# Patient Record
Sex: FEMALE | URBAN - METROPOLITAN AREA
[De-identification: names, ages, dates, MRNs, and addresses within clinical notes are randomized per-mention and may not be internally consistent; named-entity substitution may affect disease eponyms.]

---

## 2017-05-27 ENCOUNTER — OUTPATIENT (OUTPATIENT)
Dept: OUTPATIENT SERVICES | Facility: HOSPITAL | Age: 42
LOS: 1 days | Discharge: HOME | End: 2017-05-27

## 2017-06-28 DIAGNOSIS — Z00.00 ENCOUNTER FOR GENERAL ADULT MEDICAL EXAMINATION WITHOUT ABNORMAL FINDINGS: ICD-10-CM

## 2017-06-28 DIAGNOSIS — R00.2 PALPITATIONS: ICD-10-CM

## 2017-06-28 DIAGNOSIS — R06.02 SHORTNESS OF BREATH: ICD-10-CM

## 2017-06-28 DIAGNOSIS — F41.8 OTHER SPECIFIED ANXIETY DISORDERS: ICD-10-CM

## 2017-06-28 DIAGNOSIS — E66.9 OBESITY, UNSPECIFIED: ICD-10-CM

## 2017-06-28 DIAGNOSIS — R07.89 OTHER CHEST PAIN: ICD-10-CM

## 2017-08-28 ENCOUNTER — APPOINTMENT (OUTPATIENT)
Dept: OBGYN | Facility: CLINIC | Age: 42
End: 2017-08-28

## 2018-11-28 ENCOUNTER — OUTPATIENT (OUTPATIENT)
Dept: OUTPATIENT SERVICES | Facility: HOSPITAL | Age: 43
LOS: 1 days | Discharge: HOME | End: 2018-11-28

## 2018-11-28 DIAGNOSIS — E66.9 OBESITY, UNSPECIFIED: ICD-10-CM

## 2018-11-28 DIAGNOSIS — R73.03 PREDIABETES: ICD-10-CM

## 2018-11-28 DIAGNOSIS — Z00.00 ENCOUNTER FOR GENERAL ADULT MEDICAL EXAMINATION WITHOUT ABNORMAL FINDINGS: ICD-10-CM

## 2018-11-28 DIAGNOSIS — E78.5 HYPERLIPIDEMIA, UNSPECIFIED: ICD-10-CM

## 2018-12-24 ENCOUNTER — APPOINTMENT (OUTPATIENT)
Dept: OBGYN | Facility: CLINIC | Age: 43
End: 2018-12-24

## 2019-01-04 ENCOUNTER — APPOINTMENT (OUTPATIENT)
Dept: OBGYN | Facility: CLINIC | Age: 44
End: 2019-01-04

## 2019-01-04 ENCOUNTER — OUTPATIENT (OUTPATIENT)
Dept: OUTPATIENT SERVICES | Facility: HOSPITAL | Age: 44
LOS: 1 days | Discharge: HOME | End: 2019-01-04

## 2019-01-04 VITALS
BODY MASS INDEX: 31.89 KG/M2 | DIASTOLIC BLOOD PRESSURE: 80 MMHG | HEIGHT: 63 IN | WEIGHT: 180 LBS | SYSTOLIC BLOOD PRESSURE: 120 MMHG

## 2019-01-04 DIAGNOSIS — Z01.419 ENCOUNTER FOR GYNECOLOGICAL EXAMINATION (GENERAL) (ROUTINE) W/OUT ABNORMAL FINDINGS: ICD-10-CM

## 2019-01-04 NOTE — PROCEDURE
[Cervical Pap Smear] : cervical Pap smear [Liquid Base] : liquid base [Tolerated Well] : the patient tolerated the procedure well [No Complications] : there were no complications

## 2019-01-08 DIAGNOSIS — Z00.00 ENCOUNTER FOR GENERAL ADULT MEDICAL EXAMINATION WITHOUT ABNORMAL FINDINGS: ICD-10-CM

## 2019-01-15 NOTE — COUNSELING
[Breast Self Exam] : breast self exam [Nutrition] : nutrition [Exercise] : exercise [Vitamins/Supplements] : vitamins/supplements [FreeTextEntry2] : h

## 2019-01-15 NOTE — HISTORY OF PRESENT ILLNESS
[___ Year(s) Ago] : [unfilled] year(s) ago [Good] : being in good health [Hot Flashes] : hot flashes [Night Sweats] : night sweats [4/10] : described as 4/10 in severity [Fatigue] : fatigue [Cool Environment] : relieved by cool environment [Exercise] : relieved by exercise [Relaxation] : relieved by relaxation [Approximately ___ (Month)] : the LMP was approximately [unfilled] month(s) ago [Sexually Active] : is sexually active [Monogamous] : is monogamous [Male ___] : [unfilled] male [NA] : N/A [No Risk Factors, Testing Not Required] : no risk factors, testing is not required. [de-identified] : menopausal [Vaginal Itching] : no vaginal itching [Dyspareunia] : no dyspareunia

## 2019-01-15 NOTE — END OF VISIT
[FreeTextEntry3] : I personally discussed this patient with WERNER Armando. I agree with the assessment and plan as written.

## 2019-01-15 NOTE — PHYSICAL EXAM
[Awake] : awake [Alert] : alert [Soft] : soft [Oriented x3] : oriented to person, place, and time [Normal] : uterus [No Bleeding] : there was no active vaginal bleeding [Pap Obtained] : a Pap smear was performed [Retroversion] : retroverted [Uterine Adnexae] : were not tender and not enlarged [Acute Distress] : no acute distress [Mass] : no breast mass [Nipple Discharge] : no nipple discharge [Axillary LAD] : no axillary lymphadenopathy [Tender] : non tender [FreeTextEntry6] : no palpable masses [FreeTextEntry7] : no palpable masses [FreeTextEntry9] : deferred

## 2019-01-16 DIAGNOSIS — Z01.419 ENCOUNTER FOR GYNECOLOGICAL EXAMINATION (GENERAL) (ROUTINE) WITHOUT ABNORMAL FINDINGS: ICD-10-CM

## 2019-03-22 LAB — HPV HIGH+LOW RISK DNA PNL CVX: NOT DETECTED

## 2019-04-01 ENCOUNTER — OUTPATIENT (OUTPATIENT)
Dept: OUTPATIENT SERVICES | Facility: HOSPITAL | Age: 44
LOS: 1 days | Discharge: HOME | End: 2019-04-01

## 2019-04-01 DIAGNOSIS — E66.9 OBESITY, UNSPECIFIED: ICD-10-CM

## 2019-04-01 DIAGNOSIS — E78.5 HYPERLIPIDEMIA, UNSPECIFIED: ICD-10-CM

## 2019-04-01 DIAGNOSIS — R73.03 PREDIABETES: ICD-10-CM

## 2022-01-19 ENCOUNTER — EMERGENCY (EMERGENCY)
Facility: HOSPITAL | Age: 47
LOS: 0 days | Discharge: HOME | End: 2022-01-19
Attending: STUDENT IN AN ORGANIZED HEALTH CARE EDUCATION/TRAINING PROGRAM | Admitting: STUDENT IN AN ORGANIZED HEALTH CARE EDUCATION/TRAINING PROGRAM
Payer: COMMERCIAL

## 2022-01-19 VITALS
HEART RATE: 81 BPM | DIASTOLIC BLOOD PRESSURE: 69 MMHG | OXYGEN SATURATION: 100 % | RESPIRATION RATE: 61 BRPM | TEMPERATURE: 99 F | SYSTOLIC BLOOD PRESSURE: 127 MMHG
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
## 2022-01-19 DIAGNOSIS — M25.572 PAIN IN LEFT ANKLE AND JOINTS OF LEFT FOOT: ICD-10-CM
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
## 2022-01-19 DIAGNOSIS — M25.472 EFFUSION, LEFT ANKLE: ICD-10-CM

## 2022-01-19 PROCEDURE — 29515 APPLICATION SHORT LEG SPLINT: CPT

## 2022-01-19 PROCEDURE — 73610 X-RAY EXAM OF ANKLE: CPT | Mod: 26,LT

## 2022-01-19 PROCEDURE — 73590 X-RAY EXAM OF LOWER LEG: CPT | Mod: 26,LT

## 2022-01-19 PROCEDURE — 99283 EMERGENCY DEPT VISIT LOW MDM: CPT | Mod: 25

## 2022-01-19 RX ORDER — OXYCODONE AND ACETAMINOPHEN 5; 325 MG/1; MG/1
1 TABLET ORAL ONCE
Refills: 0 | Status: DISCONTINUED | OUTPATIENT
Start: 2022-01-19 | End: 2022-01-19

## 2022-01-19 RX ADMIN — OXYCODONE AND ACETAMINOPHEN 1 TABLET(S): 5; 325 TABLET ORAL at 08:50

## 2022-01-19 NOTE — ED PROVIDER NOTE - CARE PROVIDER_API CALL
Jose Eduardo Perry)  Orthopaedic Surgery  [address] [place] [address]  Phone: [phone]  Fax: [phone]  Follow Up Time:

## 2022-01-19 NOTE — ED PROVIDER NOTE - ATTENDING CONTRIBUTION TO CARE
46 yr old f w/ no pmh who presents with L ankle pain. pt states that she was walking to work, when she slipped on black ice. Pt had a mecanical fall. Pt landed on his L ankle, however, denies hitting any other part of her body. Pt denies any other medical complaints.     VITAL SIGNS: I have reviewed nursing notes and confirm.  CONSTITUTIONAL: non-toxic, well appearing  SKIN: no rash, no petechiae.  EYES: PERRL, EOMI, pink conjunctiva, anicteric  ENT: tongue midline, no exudates, MMM  NECK: Supple; no meningismus, no JVD  EXT: Normal ROM x4. No edema.  LLE: there is swelling to the L ankle, pain on palpation of the lateral mallelous, sensation intact dp+2 bilaterally, pt has limited ROM at the ankle due to pain   NEURO: Alert, oriented x3. CN2-12 intact, equal strength bilaterally, nl gait.    a/p  46 yr old f that presents with L ankle pain. will splint, xray, pain management. will dc pt with orthopedics follow up and strict return precautions.

## 2022-01-19 NOTE — ED PROVIDER NOTE - CLINICAL SUMMARY MEDICAL DECISION MAKING FREE TEXT BOX
46 yr old f that presents with L ankle pain. will splint, xray, pain management. will dc pt with orthopedics follow up and strict return precautions.

## 2022-01-19 NOTE — ED ADULT NURSE NOTE - PAIN: BODY LOCATION
10/29/2021         RE: Georgie Scott  [address]  [place] [address]        Dear Colleague,    Thank you for referring your patient, Georgie Scott, to the [facility]. Please see a copy of my visit note below.    No notes on file    Again, thank you for allowing me to participate in the care of your patient.        Sincerely,        Melani Mcdaniels, DO  
ankle/Left:

## 2022-01-19 NOTE — ED PROVIDER NOTE - PHYSICAL EXAMINATION
CONST: Well appearing in NAD  MS: Normal ROM in all extremities. No midline spinal tenderness. Left ankle with swelling and tenderness laterally with dec ROM due to pain. NV intact distally  SKIN: Warm, dry, no acute rashes. Good turgor  NEURO: A&Ox3, No focal deficits. Strength 5/5 with no sensory deficits.

## 2022-01-19 NOTE — ED ADULT TRIAGE NOTE - CHIEF COMPLAINT QUOTE
"I was on my way to work and slipped on a patch of ice and heard my left ankle snap". c/o left ankle pain

## 2022-01-19 NOTE — ED PROVIDER NOTE - PATIENT PORTAL LINK FT
You can access the FollowMyHealth Patient Portal offered by [facility] by registering at the following website: [email or web address]. By joining [employer]’s FollowMyHealth portal, you will also be able to view your health information using other applications (apps) compatible with our system.

## 2023-01-18 ENCOUNTER — TRANSCRIPTION ENCOUNTER (OUTPATIENT)
Age: 48
End: 2023-01-18

## 2024-05-04 ENCOUNTER — EMERGENCY (EMERGENCY)
Facility: HOSPITAL | Age: 49
LOS: 0 days | Discharge: HOME | End: 2024-05-04
Attending: EMERGENCY MEDICINE
Payer: COMMERCIAL

## 2024-05-04 VITALS
HEART RATE: 66 BPM | TEMPERATURE: 98 F | WEIGHT: 160.06 LBS | RESPIRATION RATE: 16 BRPM | DIASTOLIC BLOOD PRESSURE: 64 MMHG | SYSTOLIC BLOOD PRESSURE: 119 MMHG | OXYGEN SATURATION: 100 %
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
## 2024-05-04 DIAGNOSIS — S82.61XA DISPLACED FRACTURE OF LATERAL MALLEOLUS OF RIGHT FIBULA, INITIAL ENCOUNTER FOR CLOSED FRACTURE: ICD-10-CM
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
## 2024-05-04 DIAGNOSIS — M25.571 PAIN IN RIGHT ANKLE AND JOINTS OF RIGHT FOOT: ICD-10-CM

## 2024-05-04 PROCEDURE — 73590 X-RAY EXAM OF LOWER LEG: CPT | Mod: 26,RT

## 2024-05-04 PROCEDURE — 73610 X-RAY EXAM OF ANKLE: CPT | Mod: RT

## 2024-05-04 PROCEDURE — 99284 EMERGENCY DEPT VISIT MOD MDM: CPT | Mod: 57

## 2024-05-04 PROCEDURE — 29515 APPLICATION SHORT LEG SPLINT: CPT | Mod: RT

## 2024-05-04 PROCEDURE — 27786 TREATMENT OF ANKLE FRACTURE: CPT | Mod: 54,RT

## 2024-05-04 PROCEDURE — 99284 EMERGENCY DEPT VISIT MOD MDM: CPT | Mod: 25

## 2024-05-04 PROCEDURE — 73590 X-RAY EXAM OF LOWER LEG: CPT | Mod: RT

## 2024-05-04 PROCEDURE — 73610 X-RAY EXAM OF ANKLE: CPT | Mod: 26,RT

## 2024-05-04 RX ORDER — IBUPROFEN 200 MG
600 TABLET ORAL ONCE
Refills: 0 | Status: COMPLETED | OUTPATIENT
Start: 2024-05-04 | End: 2024-05-04

## 2024-05-04 RX ADMIN — Medication 600 MILLIGRAM(S): at 18:41

## 2024-05-04 NOTE — ED PROVIDER NOTE - NSFOLLOWUPINSTRUCTIONS_ED_ALL_ED_FT
Our Emergency Department Referral Coordinators will be reaching out to you in the next 24-48 hours from 9:00am to 5:00pm to schedule a follow up appointment. Please expect a phone call from the hospital in that time frame. If you do not receive a call or if you have any questions or concerns, you can reach them at   [phone].    Fracture    A fracture is a break in one of your bones. This can occur from a variety of injuries, especially traumatic ones. Symptoms include pain, bruising, or swelling. Do not use the injured limb. If a fracture is in one of the bones below your waist, do not put weight on that limb unless instructed to do so by your healthcare provider. Crutches or a cane may have been provided. A splint or cast may have been applied by your health care provider. Make sure to keep it dry and follow up with an orthopedist as instructed.    SEEK IMMEDIATE MEDICAL CARE IF YOU HAVE ANY OF THE FOLLOWING SYMPTOMS: numbness, tingling, increasing pain, or weakness in any part of the injured limb.    SPLINT CARE - AfterCare(R) Instructions(ER/ED)     Splint Care    WHAT YOU NEED TO KNOW:    Splint care is important to help protect your splint until it comes off. Some splints are made of fiberglass or plaster that will need to dry and harden. Splint care will help the splint dry and harden correctly. Even after your splint hardens, it can be damaged.    DISCHARGE INSTRUCTIONS:    Return to the emergency department if:     You have increased pain.      Your fingers or toes are numb or tingling.      You feel burning or stinging around your injury.      Your nails, fingers, or toes turn pale, blue, or gray, and feel cold.      You have new or increased trouble moving your fingers or toes.      Your swelling gets worse.      The skin under your splint is bleeding or leaking pus.     Contact your healthcare provider if:     Your hard splint gets wet or is damaged.      You have a fever.      Your splint feels tighter.      You have itchy, dry skin under your splint that is getting worse.      The skin under your splint is red, or you have a new sore.      You notice a bad smell coming from your splint.       You have questions or concerns about your condition or care.    How to care for your splint:     Wait for your hard splint to harden completely. You may have to wait up to 3 days before you can walk on a plaster splint.      Check your splint and the skin around it each day. Check your splint for damage, such as cracks and breaks. Check your skin for redness, increased swelling, and sores. Loosen the elastic bandage around your splint if it feels too tight.      Keep your splint clean and dry. Keep dirt out of your splint. Before you bathe, wrap your hard splint with 2 layers of plastic. Then put a plastic bag over it. Keep the plastic bag tightly sealed. You can also ask your healthcare provider about waterproof shields. Do not put your hard splint in the water, even with a plastic bag over it. A wet splint can make your skin itchy, and may lead to infection.      Do not put powders or deodorants inside your splint. These can dry your skin and increase itching.       Do not try to scratch the skin inside your hard splint with sharp objects. Sharp objects can break off inside your splint or hurt your skin.       Do not pull the padding out of your splint. The padding inside your splint protects your skin. You may develop a sore on your skin if you take out the padding.    Follow up with your healthcare provider as directed within 1 to 2 weeks: Write down your questions so you remember to ask them during your visits.

## 2024-05-04 NOTE — ED PROVIDER NOTE - ATTENDING APP SHARED VISIT CONTRIBUTION OF CARE
48-year-old female presents for evaluation of injury to right ankle status post fall while rollerskating today.  No other injuries reported.  Patient had trouble bearing weight.  On exam patient is in NAD, AOx3, positive swelling to right ankle with tenderness over lateral malleolus, no fibular head tenderness, no fifth metatarsal tenderness, skin is intact,

## 2024-05-04 NOTE — ED PROVIDER NOTE - CARE PROVIDER_API CALL
Milo Fontenot  Orthopaedic Surgery  [address] [place] [place] [address]  Phone: [phone]  Fax: [phone]  Follow Up Time: 1-3 Days

## 2024-05-04 NOTE — ED PROVIDER NOTE - PATIENT PORTAL LINK FT
You can access the FollowMyHealth Patient Portal offered by [facility] by registering at the following website: [email or web address]. By joining [employer]’s FollowMyHealth portal, you will also be able to view your health information using other applications (apps) compatible with our system.

## 2024-05-04 NOTE — ED PROVIDER NOTE - CLINICAL SUMMARY MEDICAL DECISION MAKING FREE TEXT BOX
X-ray was obtained and films interpreted by me.  There is a fracture distal fibula.  Results were discussed with patient.  Patient placed in splint.  Patient will need to follow with orthopedics.

## 2024-05-04 NOTE — ED PROVIDER NOTE - PHYSICAL EXAMINATION
Physical Exam    Vital Signs: I have reviewed the initial vital signs.  Constitutional: well-nourished, appears stated age, no acute distress  Skin: warm, dry  Muskuloskeletal: Right ankle: + tender, swelling over lateral malleolus and distal fibula.  Decreased range of at motion ankle.  Nontender to knee or foot.  Neurovascular intact  Neuro: AOx3, No focal deficits noted

## 2024-05-04 NOTE — ED PROVIDER NOTE - OBJECTIVE STATEMENT
48-year-old female complaining of right ankle pain.  Patient fell while rollerskating at [employer] today.  No other injuries

## 2024-05-05 ENCOUNTER — NON-APPOINTMENT (OUTPATIENT)
Age: 49
End: 2024-05-05

## 2024-05-05 ENCOUNTER — APPOINTMENT (OUTPATIENT)
Dept: ORTHOPEDIC SURGERY | Facility: CLINIC | Age: 49
End: 2024-05-05
Payer: COMMERCIAL

## 2024-05-05 DIAGNOSIS — S82.61XA DISPLACED FRACTURE OF LATERAL MALLEOLUS OF RIGHT FIBULA, INITIAL ENCOUNTER FOR CLOSED FRACTURE: ICD-10-CM

## 2024-05-05 PROCEDURE — 99213 OFFICE O/P EST LOW 20 MIN: CPT

## 2024-05-05 RX ORDER — IBUPROFEN 800 MG/1
800 TABLET, FILM COATED ORAL 3 TIMES DAILY
Qty: 60 | Refills: 0 | Status: ACTIVE | COMMUNITY
Start: 2024-05-05 | End: 1900-01-01

## 2024-05-05 NOTE — HISTORY OF PRESENT ILLNESS
[de-identified] : -year-old female is here today for evaluation of her right ankle.  Patient states yesterday she was rollerskating and she fell and twisted her ankle.  She went to the hospital and was told she had a fracture.  She was placed in a splint.  She was advised to follow-up here.  She has a boot with her from the fracture she had in the past on her left ankle.  He denies any pain in the foot.  She denies any numbness tingling or any calf pain.

## 2024-05-05 NOTE — DISCUSSION/SUMMARY
[de-identified] : At this time I discussed with her we can place her in a cast or she can remain in the boot nonweightbearing with the crutches.  She preferred the boot.  She should wear the boot at all times except for bathing.  She is going to be nonweightbearing with the crutches.  I discussed with her to keep the leg up and elevated, ice, Tylenol and ibuprofen as needed for pain.  I will see her back in 1 to 2 weeks for repeat x-rays and evaluation to make sure there is no shift or change in the alignment of the fracture. Patient will call me if any other problems or concerns.  Patient verbalized understanding and agreed with the plan, all questions were answered in the office today.

## 2024-05-05 NOTE — IMAGING
[de-identified] : On examination of her right ankle she has swelling and ecchymosis of the ankle.  Skin is intact.  No erythema.  She is tender over the lateral malleolus ATFL and CFL.  No tenderness over the medial malleolus or the deltoid ligament.  No tenderness over the midfoot or the metatarsals.  No tenderness over the Lisfranc joint.  No tenderness over the talar dome.  No tenderness over the Achilles or the calcaneus, negative Valera's test, no calf tenderness.  She is able to dorsiflex and plantarflex ankle but has some decreased range of motion due to pain and swelling.  Sensation is intact throughout, 2+ DP and PT pulses.  X-rays taken at the hospital reviewed in the office today show a nondisplaced spiral fracture of the lateral malleolus.  No widening of the medial clear space or the syndesmosis.  No other fractures or bony abnormalities noted.

## 2024-05-16 ENCOUNTER — NON-APPOINTMENT (OUTPATIENT)
Age: 49
End: 2024-05-16

## 2024-05-16 ENCOUNTER — APPOINTMENT (OUTPATIENT)
Dept: ORTHOPEDIC SURGERY | Facility: CLINIC | Age: 49
End: 2024-05-16
Payer: COMMERCIAL

## 2024-05-16 PROCEDURE — 73610 X-RAY EXAM OF ANKLE: CPT | Mod: RT

## 2024-05-16 PROCEDURE — 99213 OFFICE O/P EST LOW 20 MIN: CPT

## 2024-05-16 NOTE — HISTORY OF PRESENT ILLNESS
[de-identified] : 48-year-old female is here today for evaluation of her right lateral malleolus fracture.  She has been in a cam walker boot using crutches.  She states the pain has improved a lot.  She is still having some swelling.  She denies any new injury or trauma.  She denies any numbness tingling or any calf pain.

## 2024-05-16 NOTE — IMAGING
[de-identified] : On examination of her right ankle she resolving swelling and ecchymosis of the ankle.  Skin is intact.  No erythema.  She is tender over the lateral malleolus ATFL and CFL.  No tenderness over the medial malleolus or the deltoid ligament.  No tenderness over the midfoot or the metatarsals.  No tenderness over the Lisfranc joint.  No tenderness over the talar dome.  No tenderness over the Achilles or the calcaneus, negative Valera's test, no calf tenderness.  She is able to dorsiflex and plantarflex ankle but has some decreased range of motion due to pain and swelling.  Sensation is intact throughout, 2+ DP and PT pulses.  X-rays repeated in the office today of the right ankle continue to show a nondisplaced spiral fracture of the lateral malleolus.  The alignment is unchanged from the previous x-rays.  There is no widening of the medial clear space or the syndesmosis.

## 2024-05-16 NOTE — DISCUSSION/SUMMARY
[de-identified] : Next week she can start to weight-bear as tolerated in the boot.  I discussed with her to continue to elevate, warm soaks with Epsom salts.  Anti-inflammatories as needed for pain.  I will see her back in 3 weeks for repeat x-rays and evaluation.  Patient will call me if any other problems or concerns.  Patient verbalized understanding and agreed with the plan, all questions were answered in the office today.

## 2024-05-17 PROBLEM — Z78.9 OTHER SPECIFIED HEALTH STATUS: Chronic | Status: ACTIVE | Noted: 2024-05-04

## 2024-06-13 ENCOUNTER — APPOINTMENT (OUTPATIENT)
Dept: ORTHOPEDIC SURGERY | Facility: CLINIC | Age: 49
End: 2024-06-13
Payer: COMMERCIAL

## 2024-06-13 DIAGNOSIS — S82.64XA NONDISPLACED FRACTURE OF LATERAL MALLEOLUS OF RIGHT FIBULA, INITIAL ENCOUNTER FOR CLOSED FRACTURE: ICD-10-CM

## 2024-06-13 PROCEDURE — L4350: CPT | Mod: RT

## 2024-06-13 PROCEDURE — 73610 X-RAY EXAM OF ANKLE: CPT | Mod: RT

## 2024-06-13 PROCEDURE — 99213 OFFICE O/P EST LOW 20 MIN: CPT

## 2024-06-13 NOTE — IMAGING
[de-identified] : On examination of her right ankle she still has some swelling of the ankle.  Skin is intact.  No erythema.  She is mildly tender over the lateral malleolus ATFL and CFL.  No tenderness over the medial malleolus or the deltoid ligament.  No tenderness over the midfoot or the metatarsals.  No tenderness over the Lisfranc joint.  No tenderness over the talar dome.  No tenderness over the Achilles or the calcaneus, negative Valera's test, no calf tenderness.  She is able to dorsiflex and plantarflex ankle but has some stiffness.  Sensation is intact throughout, 2+ DP and PT pulses.  X-rays repeated in the office today of the right ankle continue to show a nondisplaced spiral fracture of the lateral malleolus.  The alignment is unchanged from the previous x-rays.  There is no widening of the medial clear space or the syndesmosis.  There is some more callus formation.

## 2024-06-13 NOTE — DISCUSSION/SUMMARY
[de-identified] : I recommend she wear the cam walker boot for 2 more weeks.  At that point she can wean out of the boot into an Aircast.  I gave her an Aircast in the office today.  She is going to start working on some range of motion.  Continue warm soaks Epsom salts and anti-inflammatories as needed.  I will see her back in 4 to 6 weeks for repeat x-rays and evaluation. Patient will call me if any other problems or concerns.  Patient verbalized understanding and agreed with the plan, all questions were answered in the office today.

## 2024-06-13 NOTE — HISTORY OF PRESENT ILLNESS
[de-identified] : 48-year-old female is here today for evaluation of her right lateral malleolus fracture.  She is now almost 6 weeks out from the injury.  She has been in the cam walker boot weightbearing as tolerated.  She states she is doing well, the pain is mild.  She still gets swelling on and off in the ankle, mostly at the end of the day.  She denies any new injury or trauma.  She denies any numbness tingling or any calf pain.

## 2024-07-18 ENCOUNTER — APPOINTMENT (OUTPATIENT)
Dept: ORTHOPEDIC SURGERY | Facility: CLINIC | Age: 49
End: 2024-07-18

## 2024-07-22 ENCOUNTER — APPOINTMENT (OUTPATIENT)
Dept: ORTHOPEDIC SURGERY | Facility: CLINIC | Age: 49
End: 2024-07-22
Payer: COMMERCIAL

## 2024-07-22 DIAGNOSIS — S82.64XA NONDISPLACED FRACTURE OF LATERAL MALLEOLUS OF RIGHT FIBULA, INITIAL ENCOUNTER FOR CLOSED FRACTURE: ICD-10-CM

## 2024-07-22 PROCEDURE — 99204 OFFICE O/P NEW MOD 45 MIN: CPT | Mod: 57

## 2024-07-22 PROCEDURE — 73610 X-RAY EXAM OF ANKLE: CPT | Mod: RT

## 2024-07-22 PROCEDURE — 27786 TREATMENT OF ANKLE FRACTURE: CPT | Mod: RT

## 2024-07-22 NOTE — HISTORY OF PRESENT ILLNESS
[de-identified] : 49-year-old patient comes in to see me for her right ankle.  She injured her right ankle approximately 2 months ago.  She has been slowly transitioning out of the boot into a brace.  She is doing home exercises.  She still has some residual pain.  Localized pain laterally.  It is improving.  Denies any calf pain chest pain shortness of breath.

## 2024-07-22 NOTE — DATA REVIEWED
[FreeTextEntry1] : 3 views of the patient's right ankle ordered and reviewed by me personally.  X-rays demonstrate a healing lateral malleolus fracture.  There is still visible fracture line.  Ankle joint is congruent.

## 2024-07-22 NOTE — PHYSICAL EXAM
[de-identified] : She is tender over the lateral malleolus but mildly.  Nontender elsewhere.  Her ankle is stable.  She has grossly intact range of motion.  Neurovascular intact distally.

## 2024-07-22 NOTE — DISCUSSION/SUMMARY
[de-identified] : I discussed the patient's findings with her.  We will initiate fracture care given today she still symptomatic.  I would like her to continue to work on a home exercise program and limit impact activity.  I will see her back in 4 weeks for next fracture care appointment.  All questions sought and answered.

## 2024-08-19 ENCOUNTER — APPOINTMENT (OUTPATIENT)
Dept: ORTHOPEDIC SURGERY | Facility: CLINIC | Age: 49
End: 2024-08-19
Payer: COMMERCIAL

## 2024-08-19 DIAGNOSIS — S82.64XA NONDISPLACED FRACTURE OF LATERAL MALLEOLUS OF RIGHT FIBULA, INITIAL ENCOUNTER FOR CLOSED FRACTURE: ICD-10-CM

## 2024-08-19 PROCEDURE — 99024 POSTOP FOLLOW-UP VISIT: CPT

## 2024-08-19 PROCEDURE — 73610 X-RAY EXAM OF ANKLE: CPT | Mod: RT

## 2024-08-19 NOTE — DISCUSSION/SUMMARY
[de-identified] : I discussed the patient's clinical and radiographic findings with the patient.  She is healed.  I will see her back in as-needed basis.  All questions sought and answered.

## 2024-08-19 NOTE — DATA REVIEWED
[FreeTextEntry1] : 3 views of the right ankle ordered reviewed by me personally.  X-rays demonstrate evidence of a healed lateral malleolus fracture without interval displacement.  The ankle joint is congruent.

## 2024-08-19 NOTE — PHYSICAL EXAM
[de-identified] : She is alert oriented x 3.  Pleasant cooperative.  I examined the right lower extremity.  Nontender over the lateral malleolus.  Full range of motion of the ankle.  Compartment soft compressible.  Neurovascular intact.

## 2024-08-19 NOTE — HISTORY OF PRESENT ILLNESS
[de-identified] : 49-year-old patient here for follow-up of her right ankle.  She is doing great from a pain standpoint.  She has no pain walking with the brace on.  Does not endorse any interval trauma.

## 2025-02-03 NOTE — ED ADULT TRIAGE NOTE - WEIGHT IN LBS
Medication Requested:     Disp Refills Start End     sertraline (Zoloft) 100 MG tablet 30 tablet 0 1/3/2025 12/29/2025    Sig - Route: Take 1 tablet by mouth daily.         Disp Refills Start End     oxcarbazepine (TRILEPTAL) 600 MG tablet 60 tablet 0 1/3/2025 --    Sig - Route: Take 1 tablet by mouth in the morning and 1 tablet in the evening.       No protocol for requested medication.    Last office visit date: 8/19/24  Follow up: rtc  No Show/Cancel: none  Next visit: 2/19/2025    Pharmacy: [id number] - [place], [place] - [address], [address]    Order pended, routed to clinician for review.           
160
Statement Selected